# Patient Record
Sex: MALE | Race: WHITE
[De-identification: names, ages, dates, MRNs, and addresses within clinical notes are randomized per-mention and may not be internally consistent; named-entity substitution may affect disease eponyms.]

---

## 2017-03-27 ENCOUNTER — HOSPITAL ENCOUNTER (OUTPATIENT)
Dept: HOSPITAL 47 - ORWHC2ENDO | Age: 67
Discharge: HOME | End: 2017-03-27
Payer: MEDICAID

## 2017-03-27 VITALS — DIASTOLIC BLOOD PRESSURE: 102 MMHG | HEART RATE: 40 BPM | SYSTOLIC BLOOD PRESSURE: 149 MMHG

## 2017-03-27 VITALS — BODY MASS INDEX: 36.6 KG/M2

## 2017-03-27 VITALS — TEMPERATURE: 97.8 F | RESPIRATION RATE: 18 BRPM

## 2017-03-27 DIAGNOSIS — D12.2: ICD-10-CM

## 2017-03-27 DIAGNOSIS — I10: ICD-10-CM

## 2017-03-27 DIAGNOSIS — Z79.899: ICD-10-CM

## 2017-03-27 DIAGNOSIS — K57.30: ICD-10-CM

## 2017-03-27 DIAGNOSIS — K64.8: ICD-10-CM

## 2017-03-27 DIAGNOSIS — Z12.11: Primary | ICD-10-CM

## 2017-03-27 DIAGNOSIS — Z79.82: ICD-10-CM

## 2017-03-27 PROCEDURE — 88305 TISSUE EXAM BY PATHOLOGIST: CPT

## 2017-03-27 PROCEDURE — 45380 COLONOSCOPY AND BIOPSY: CPT

## 2017-03-27 NOTE — P.PCN
Date of Procedure: 03/27/17


Procedure(s) Performed: 


Procedure: Colonoscopy and biopsy.





Preoperative diagnosis: Screening for neoplasia.





Postoperative diagnosis: 1. Diminutive polyp right colon biopsied but no other 

polyps or tumors seen.  2. Sigmoid diverticulosis with no evidence of acute 

diverticulitis or strictures.





Preparation: HalfLytely prep.





Sedation: Was provided by anesthesia.





Brief clinical history: The patient is a 66-year-old male who is referred for 

this evaluation for screening for neoplasia.  He has no abdominal complaints, 

bleeding or anemia.  No family history of colon cancer.  His last colonoscopy 

was around 12 years ago.





Procedure: With the patient on his left lateral decubitus position and after 

informed consent and adequate sedation, the perianal area was inspected and it 

did not show any fissures or fistulas.  There were no masses felt on digital 

rectal examination.  The Olympus CFQ 160L video colonoscope was then inserted 

in the rectum in the usual fashion and advanced to the cecum.  The preparation 

was less than ideal as there was some solid fecal debris and small fecal 

material that could not be suctioned totally.  The mucosa appeared healthy.  

There was a diminutive polyp in the proximal right colon which I biopsied but 

there were no large polyps or tumors.  Multiple diverticular orifices were seen 

scattered in the sigmoid with no evidence of acute diverticulitis or 

strictures.  I retroflexed the endoscope in the rectum before the endoscope was 

withdrawn.  Low-grade internal hemorrhoids were noted but there was no bleeding.





The patient tolerated the procedure well.





Plan: The patient was reassured.  Discussed dietary measures.  In light of his 

less than ideal preparation and the finding of a diminutive polyp on the right 

colon, I recommended repeat exam in 5 years.  He will follow up with you as 

planned.

## 2017-08-17 ENCOUNTER — HOSPITAL ENCOUNTER (OUTPATIENT)
Dept: HOSPITAL 47 - LABWHC1 | Age: 67
Discharge: HOME | End: 2017-08-17
Payer: MEDICAID

## 2017-08-17 DIAGNOSIS — E66.9: ICD-10-CM

## 2017-08-17 DIAGNOSIS — N40.0: ICD-10-CM

## 2017-08-17 DIAGNOSIS — I10: Primary | ICD-10-CM

## 2017-08-17 DIAGNOSIS — E78.00: ICD-10-CM

## 2017-08-17 LAB
ALP SERPL-CCNC: 86 U/L (ref 38–126)
ALT SERPL-CCNC: 34 U/L (ref 21–72)
ANION GAP SERPL CALC-SCNC: 12 MMOL/L
AST SERPL-CCNC: 27 U/L (ref 17–59)
BASOPHILS # BLD AUTO: 0 K/UL (ref 0–0.2)
BASOPHILS NFR BLD AUTO: 0 %
BUN SERPL-SCNC: 16 MG/DL (ref 9–20)
CALCIUM SPEC-MCNC: 9.2 MG/DL (ref 8.4–10.2)
CH: 32.8
CHCM: 34.3
CHLORIDE SERPL-SCNC: 109 MMOL/L (ref 98–107)
CHOLEST SERPL-MCNC: 143 MG/DL (ref ?–200)
CK SERPL-CCNC: 193 U/L (ref 55–170)
CO2 SERPL-SCNC: 22 MMOL/L (ref 22–30)
EOSINOPHIL # BLD AUTO: 0.1 K/UL (ref 0–0.7)
EOSINOPHIL NFR BLD AUTO: 2 %
ERYTHROCYTE [DISTWIDTH] IN BLOOD BY AUTOMATED COUNT: 4.7 M/UL (ref 4.3–5.9)
ERYTHROCYTE [DISTWIDTH] IN BLOOD: 14 % (ref 11.5–15.5)
GLUCOSE SERPL-MCNC: 106 MG/DL (ref 74–99)
HCT VFR BLD AUTO: 45.1 % (ref 39–53)
HDLC SERPL-MCNC: 35 MG/DL (ref 40–60)
HDW: 2.66
HEMOGLOBIN A1C: 5.2 % (ref 4.2–6.1)
HGB BLD-MCNC: 14.9 GM/DL (ref 13–17.5)
LUC NFR BLD AUTO: 2 %
LYMPHOCYTES # SPEC AUTO: 1.1 K/UL (ref 1–4.8)
LYMPHOCYTES NFR SPEC AUTO: 20 %
MCH RBC QN AUTO: 31.6 PG (ref 25–35)
MCHC RBC AUTO-ENTMCNC: 32.9 G/DL (ref 31–37)
MCV RBC AUTO: 96 FL (ref 80–100)
MONOCYTES # BLD AUTO: 0.4 K/UL (ref 0–1)
MONOCYTES NFR BLD AUTO: 7 %
NEUTROPHILS # BLD AUTO: 3.9 K/UL (ref 1.3–7.7)
NEUTROPHILS NFR BLD AUTO: 70 %
NON-AFRICAN AMERICAN GFR(MDRD): >60
PH UR: 6.5 [PH] (ref 5–8)
POTASSIUM SERPL-SCNC: 4 MMOL/L (ref 3.5–5.1)
PROT SERPL-MCNC: 6.5 G/DL (ref 6.3–8.2)
PSA SERPL-MCNC: 0.27 NG/ML (ref 0–4)
SODIUM SERPL-SCNC: 143 MMOL/L (ref 137–145)
SP GR UR: 1.01 (ref 1–1.03)
UA BILLING (MACRO VS. MICRO): (no result)
URATE SERPL-MCNC: 6 MG/DL (ref 3.5–8.5)
UROBILINOGEN UR QL STRIP: <2 MG/DL (ref ?–2)
WBC # BLD AUTO: 0.09 10*3/UL
WBC # BLD AUTO: 5.6 K/UL (ref 3.8–10.6)
WBC (PEROX): 5.89

## 2017-08-17 PROCEDURE — 84153 ASSAY OF PSA TOTAL: CPT

## 2017-08-17 PROCEDURE — 80053 COMPREHEN METABOLIC PANEL: CPT

## 2017-08-17 PROCEDURE — 36415 COLL VENOUS BLD VENIPUNCTURE: CPT

## 2017-08-17 PROCEDURE — 80061 LIPID PANEL: CPT

## 2017-08-17 PROCEDURE — 84550 ASSAY OF BLOOD/URIC ACID: CPT

## 2017-08-17 PROCEDURE — 82550 ASSAY OF CK (CPK): CPT

## 2017-08-17 PROCEDURE — 85025 COMPLETE CBC W/AUTO DIFF WBC: CPT

## 2017-08-17 PROCEDURE — 84443 ASSAY THYROID STIM HORMONE: CPT

## 2017-08-17 PROCEDURE — 83036 HEMOGLOBIN GLYCOSYLATED A1C: CPT

## 2017-08-17 PROCEDURE — 84439 ASSAY OF FREE THYROXINE: CPT

## 2017-08-17 PROCEDURE — 81003 URINALYSIS AUTO W/O SCOPE: CPT

## 2019-01-18 ENCOUNTER — HOSPITAL ENCOUNTER (OUTPATIENT)
Dept: HOSPITAL 47 - LABWHC1 | Age: 69
Discharge: HOME | End: 2019-01-18
Attending: NURSE PRACTITIONER
Payer: MEDICARE

## 2019-01-18 DIAGNOSIS — E78.00: ICD-10-CM

## 2019-01-18 DIAGNOSIS — Z00.00: Primary | ICD-10-CM

## 2019-01-18 DIAGNOSIS — I10: ICD-10-CM

## 2019-01-18 DIAGNOSIS — N40.1: ICD-10-CM

## 2019-01-18 DIAGNOSIS — R73.9: ICD-10-CM

## 2019-01-18 LAB
ALBUMIN SERPL-MCNC: 4.3 G/DL (ref 3.8–4.9)
ALBUMIN/GLOB SERPL: 1.87 G/DL (ref 1.2–2.1)
ALP SERPL-CCNC: 98 U/L (ref 41–126)
ALT SERPL-CCNC: 24 U/L (ref 10–49)
ANION GAP SERPL CALC-SCNC: 9.3 MMOL/L (ref 4–12)
AST SERPL-CCNC: 27 U/L (ref 14–35)
BASOPHILS # BLD AUTO: 0 K/UL (ref 0–0.2)
BASOPHILS NFR BLD AUTO: 0 %
BUN SERPL-SCNC: 23 MG/DL (ref 9–27)
CALCIUM SPEC-MCNC: 9.1 MG/DL (ref 8.7–10.3)
CHLORIDE SERPL-SCNC: 106 MMOL/L (ref 96–109)
CHOLEST SERPL-MCNC: 181 MG/DL (ref 0–200)
CO2 SERPL-SCNC: 25.7 MMOL/L (ref 21.6–31.8)
EOSINOPHIL # BLD AUTO: 0.1 K/UL (ref 0–0.7)
EOSINOPHIL NFR BLD AUTO: 2 %
ERYTHROCYTE [DISTWIDTH] IN BLOOD BY AUTOMATED COUNT: 4.83 M/UL (ref 4.3–5.9)
ERYTHROCYTE [DISTWIDTH] IN BLOOD: 13.3 % (ref 11.5–15.5)
GLOBULIN SER CALC-MCNC: 2.3 G/DL (ref 1.6–3.3)
GLUCOSE SERPL-MCNC: 97 MG/DL (ref 70–110)
HBA1C MFR BLD: 5.1 % (ref 4–6)
HCT VFR BLD AUTO: 45.6 % (ref 39–53)
HDLC SERPL-MCNC: 38 MG/DL (ref 40–60)
HGB BLD-MCNC: 15.3 GM/DL (ref 13–17.5)
LDLC SERPL CALC-MCNC: 127.4 MG/DL (ref 0–131)
LYMPHOCYTES # SPEC AUTO: 0.9 K/UL (ref 1–4.8)
LYMPHOCYTES NFR SPEC AUTO: 16 %
MCH RBC QN AUTO: 31.6 PG (ref 25–35)
MCHC RBC AUTO-ENTMCNC: 33.5 G/DL (ref 31–37)
MCV RBC AUTO: 94.4 FL (ref 80–100)
MONOCYTES # BLD AUTO: 0.6 K/UL (ref 0–1)
MONOCYTES NFR BLD AUTO: 11 %
NEUTROPHILS # BLD AUTO: 3.9 K/UL (ref 1.3–7.7)
NEUTROPHILS NFR BLD AUTO: 69 %
PLATELET # BLD AUTO: 156 K/UL (ref 150–450)
POTASSIUM SERPL-SCNC: 4.3 MMOL/L (ref 3.5–5.5)
PROT SERPL-MCNC: 6.6 G/DL (ref 6.2–8.2)
SODIUM SERPL-SCNC: 141 MMOL/L (ref 135–145)
T4 FREE SERPL-MCNC: 1.2 NG/DL (ref 0.8–1.8)
TRIGL SERPL-MCNC: 78 MG/DL (ref 0–149)
URATE SERPL-MCNC: 6.5 MG/DL (ref 3.7–8.7)
VLDLC SERPL CALC-MCNC: 15.6 MG/DL (ref 5–40)
WBC # BLD AUTO: 5.6 K/UL (ref 3.8–10.6)

## 2019-01-18 PROCEDURE — 80061 LIPID PANEL: CPT

## 2019-01-18 PROCEDURE — 80053 COMPREHEN METABOLIC PANEL: CPT

## 2019-01-18 PROCEDURE — 84443 ASSAY THYROID STIM HORMONE: CPT

## 2019-01-18 PROCEDURE — 85025 COMPLETE CBC W/AUTO DIFF WBC: CPT

## 2019-01-18 PROCEDURE — 84153 ASSAY OF PSA TOTAL: CPT

## 2019-01-18 PROCEDURE — 84550 ASSAY OF BLOOD/URIC ACID: CPT

## 2019-01-18 PROCEDURE — 83036 HEMOGLOBIN GLYCOSYLATED A1C: CPT

## 2019-01-18 PROCEDURE — 84439 ASSAY OF FREE THYROXINE: CPT

## 2019-01-18 PROCEDURE — 36415 COLL VENOUS BLD VENIPUNCTURE: CPT

## 2019-01-23 ENCOUNTER — HOSPITAL ENCOUNTER (OUTPATIENT)
Dept: HOSPITAL 47 - RADUSWWP | Age: 69
End: 2019-01-23
Attending: INTERNAL MEDICINE
Payer: MEDICARE

## 2019-01-23 DIAGNOSIS — N43.3: Primary | ICD-10-CM

## 2019-01-23 DIAGNOSIS — I27.21: ICD-10-CM

## 2019-01-23 DIAGNOSIS — I71.2: ICD-10-CM

## 2019-01-23 DIAGNOSIS — I77.810: ICD-10-CM

## 2019-01-23 DIAGNOSIS — R91.1: ICD-10-CM

## 2019-01-23 DIAGNOSIS — I25.10: ICD-10-CM

## 2019-01-23 PROCEDURE — 93975 VASCULAR STUDY: CPT

## 2019-01-23 PROCEDURE — 76870 US EXAM SCROTUM: CPT

## 2019-01-23 PROCEDURE — 71275 CT ANGIOGRAPHY CHEST: CPT

## 2019-01-23 NOTE — CT
EXAMINATION TYPE: CT angio chest

 

DATE OF EXAM: 1/23/2019

 

COMPARISON: 8/26/2012

 

HISTORY: 68-year-old male Thoracic aortic ectasia. Family history of aneurysm.

 

TECHNIQUE: Contiguous axial scanning of the chest performed with IV Contrast, patient injected with 1
00 mL of Isovue 370. Coronal/sagittal MIP reconstructions performed. 3-D reconstructions generated on
 a dedicated independent workstation.

 

CT DLP: 762 mGycm

Automated exposure control for dose reduction was used.

 

FINDINGS: 

Heart upper limits of normal in size without pericardial effusion. Extensive coronary vessel calcific
ations are present.

 

Aortic root measures 4.4 cm versus approximately 4.2 cm in 2012.

Ascending aorta measures 4.6 cm versus 4.4 cm in 2012.

Proximal arch measures 4.4 cm.

Mild atherosclerotic arch calcifications with bovine configuration to the aortic arch.

Small 8 mm diverticulum at the origin of what appears to be the bronchial artery. This seems to have 
been present on the 2012 exam in retrospect.

Upper descending thoracic aorta measures 3.2 cm versus 3.2 cm, previously.

Lower descending thoracic aorta is ectatic at 2.8 cm versus 2.7 cm, previously.

Aorta at the thoracoabdominal junction is ectatic at 2.9 cm versus 2.8 cm, previously.

There is a separate origin of the common hepatic artery from the aorta. Additionally, accessory right
 renal artery is noted.

 

Right and left main pulmonary arteries measure 3.0 and 2.7 cm, respectively.

 

No thoracic lymphadenopathy by CT size criteria.

 

Evaluation of the lungs shows mild diffuse bronchial wall thickening. Subpleural strandy opacity mary
pheral right midlung has increased in the interval suggesting scarring or atelectasis.

 

Stable 7 mm anterior right midlung pulmonary nodule, axial image 61. No consolidation or pleural effu
ольга.

 

Mild diffuse low-density thickening of the bilateral adrenal glands without discrete nodularity.

 

Bones: Scattered small endplate Schmorl's nodes mid to lower thoracic spine. Fatty matrix hemangioma 
within the T9 vertebral body. No osseous structure process.

 

 

IMPRESSION: 

 

1. THORACIC AORTIC ANEURYSM (AORTIC ROOT 4.4 CM VERSUS 4.2 CM IN 2012, ASCENDING 4.6 CM VERSUS 4.4 CM
, PROXIMAL ARCH 4.4 CM, UPPER DESCENDING 3.2 CM STABLE).

2. ADDITIONAL ECTASIA LOWER DESCENDING THORACIC AORTA UP TO 2.9 CM. INCIDENTAL 8 MM DIVERTICULUM AT T
HE ORIGIN OF THE BRONCHIAL ARTERY WAS PRESENT IN 2012.

3. CAD AND PULMONARY ARTERIAL HYPERTENSION.

4. MILD DIFFUSE BRONCHIAL WALL THICKENING SUGGESTS BRONCHITIS OR ASTHMA. 7 MM RIGHT MID LUNG PULMONAR
Y NODULE IS STABLE AND BENIGN.

## 2019-01-23 NOTE — US
EXAMINATION TYPE: US scrotum with doppler.  Grayscale and color Doppler Duplex imaging performed of t
abebe scrotum.

 

DATE OF EXAM: 1/23/2019

 

COMPARISON: NONE

 

CLINICAL HISTORY: I77.810 Thoracic aortic ectasia, D29.22 Benign. swollen right testicle, no pain

 

 

EXAM MEASUREMENTS:

 

TESTICLES:

Right Testicle:  4.4 x 3.3 x 2.7 cm

Left Testicle:  4.7 x 3.3 x 2.9 cm

 

EPIDIDYMIS HEAD:

Right Epididymis:  Not seen due to fluid collection obscuring anatomy

Left Epididymis:  1.5 cm  

 

Doppler performed to assess for testicular vascularity; good bilateral color flow and waveforms are s
een.   There is no evidence of testicular torsion.

 

Presence of hydroceles:  YES, extensive bilateral loculated fluid collections

Presence of varicoceles:  no

 

Unable to image testicles side by side due to extensive loculated fluid collections. 

 

 

 

 

 

IMPRESSION:

1. Extensive loculated fluid collections/hydroceles are noted bilaterally.

## 2019-04-05 ENCOUNTER — HOSPITAL ENCOUNTER (OUTPATIENT)
Dept: HOSPITAL 47 - LABWHC1 | Age: 69
Discharge: HOME | End: 2019-04-05
Attending: INTERNAL MEDICINE
Payer: MEDICARE

## 2019-04-05 DIAGNOSIS — E78.2: Primary | ICD-10-CM

## 2019-04-05 LAB
ALT SERPL-CCNC: 30 U/L (ref 10–49)
AST SERPL-CCNC: 33 U/L (ref 14–35)
CHOLEST SERPL-MCNC: 107 MG/DL (ref 0–200)
HDLC SERPL-MCNC: 32 MG/DL (ref 40–60)
LDLC SERPL CALC-MCNC: 57 MG/DL (ref 0–131)
TRIGL SERPL-MCNC: 90 MG/DL (ref 0–149)
VLDLC SERPL CALC-MCNC: 18 MG/DL (ref 5–40)

## 2019-04-05 PROCEDURE — 84460 ALANINE AMINO (ALT) (SGPT): CPT

## 2019-04-05 PROCEDURE — 36415 COLL VENOUS BLD VENIPUNCTURE: CPT

## 2019-04-05 PROCEDURE — 84450 TRANSFERASE (AST) (SGOT): CPT

## 2019-04-05 PROCEDURE — 80061 LIPID PANEL: CPT

## 2019-08-19 ENCOUNTER — HOSPITAL ENCOUNTER (OUTPATIENT)
Dept: HOSPITAL 47 - RADCTMAIN | Age: 69
Discharge: HOME | End: 2019-08-19
Attending: THORACIC SURGERY (CARDIOTHORACIC VASCULAR SURGERY)
Payer: MEDICARE

## 2019-08-19 DIAGNOSIS — R91.1: ICD-10-CM

## 2019-08-19 DIAGNOSIS — I71.2: Primary | ICD-10-CM

## 2019-08-19 LAB
ALBUMIN SERPL-MCNC: 3.9 G/DL (ref 3.5–5)
ALP SERPL-CCNC: 67 U/L (ref 38–126)
ALT SERPL-CCNC: 31 U/L (ref 21–72)
ANION GAP SERPL CALC-SCNC: 7 MMOL/L
AST SERPL-CCNC: 37 U/L (ref 17–59)
BASOPHILS # BLD AUTO: 0 K/UL (ref 0–0.2)
BASOPHILS NFR BLD AUTO: 0 %
BUN SERPL-SCNC: 21 MG/DL (ref 9–20)
CALCIUM SPEC-MCNC: 8.8 MG/DL (ref 8.4–10.2)
CHLORIDE SERPL-SCNC: 111 MMOL/L (ref 98–107)
CHOLEST SERPL-MCNC: 111 MG/DL (ref ?–200)
CO2 SERPL-SCNC: 25 MMOL/L (ref 22–30)
EOSINOPHIL # BLD AUTO: 0.1 K/UL (ref 0–0.7)
EOSINOPHIL NFR BLD AUTO: 2 %
ERYTHROCYTE [DISTWIDTH] IN BLOOD BY AUTOMATED COUNT: 4.7 M/UL (ref 4.3–5.9)
ERYTHROCYTE [DISTWIDTH] IN BLOOD: 15.1 % (ref 11.5–15.5)
GLUCOSE SERPL-MCNC: 113 MG/DL (ref 74–99)
HBA1C MFR BLD: 5.5 % (ref 4–6)
HCT VFR BLD AUTO: 43.7 % (ref 39–53)
HDLC SERPL-MCNC: 34 MG/DL (ref 40–60)
HGB BLD-MCNC: 14.7 GM/DL (ref 13–17.5)
LDLC SERPL CALC-MCNC: 65 MG/DL (ref 0–99)
LYMPHOCYTES # SPEC AUTO: 1.1 K/UL (ref 1–4.8)
LYMPHOCYTES NFR SPEC AUTO: 19 %
MAGNESIUM SPEC-SCNC: 2.1 MG/DL (ref 1.6–2.3)
MCH RBC QN AUTO: 31.3 PG (ref 25–35)
MCHC RBC AUTO-ENTMCNC: 33.7 G/DL (ref 31–37)
MCV RBC AUTO: 93 FL (ref 80–100)
MONOCYTES # BLD AUTO: 0.4 K/UL (ref 0–1)
MONOCYTES NFR BLD AUTO: 7 %
NEUTROPHILS # BLD AUTO: 4.1 K/UL (ref 1.3–7.7)
NEUTROPHILS NFR BLD AUTO: 70 %
PLATELET # BLD AUTO: 150 K/UL (ref 150–450)
POTASSIUM SERPL-SCNC: 4.4 MMOL/L (ref 3.5–5.1)
PROT SERPL-MCNC: 6.8 G/DL (ref 6.3–8.2)
SODIUM SERPL-SCNC: 143 MMOL/L (ref 137–145)
T4 FREE SERPL-MCNC: 1 NG/DL (ref 0.78–2.19)
TRIGL SERPL-MCNC: 61 MG/DL (ref ?–150)
WBC # BLD AUTO: 5.8 K/UL (ref 3.8–10.6)

## 2019-08-19 PROCEDURE — 80053 COMPREHEN METABOLIC PANEL: CPT

## 2019-08-19 PROCEDURE — 36415 COLL VENOUS BLD VENIPUNCTURE: CPT

## 2019-08-19 PROCEDURE — 71275 CT ANGIOGRAPHY CHEST: CPT

## 2019-08-19 PROCEDURE — 85025 COMPLETE CBC W/AUTO DIFF WBC: CPT

## 2019-08-19 PROCEDURE — 83036 HEMOGLOBIN GLYCOSYLATED A1C: CPT

## 2019-08-19 PROCEDURE — 84443 ASSAY THYROID STIM HORMONE: CPT

## 2019-08-19 PROCEDURE — 80061 LIPID PANEL: CPT

## 2019-08-19 PROCEDURE — 84439 ASSAY OF FREE THYROXINE: CPT

## 2019-08-19 PROCEDURE — 83735 ASSAY OF MAGNESIUM: CPT

## 2019-08-19 NOTE — CT
EXAMINATION TYPE: CT angio chest

 

DATE OF EXAM: 8/19/2019

 

COMPARISON: 1/23/2019

 

HISTORY: Thoracic aortic aneurysm, without rupture

 

CT DLP: 749.9 mGycm, Automated exposure control for dose reduction was used.

 

CONTRAST: Performed injected with 100 mL of Isovue 370.

 

TECHNIQUE: Axial images were obtained at 5 mm thick sections.  Reconstructed images are reviewed on [employer] computer in the coronal plane. 

 

FINDINGS: Portion of the thyroid visualized is normal.

 

There is an area of pleural thickening along the right upper lateral chest measuring 1.0 cm. Series 5
 image 24. There is a nodule at the same level measuring 0.5 cm.

 

No enlarged mediastinal or hilar adenopathy is evident.   The ascending aorta diameter at the level o
f the main pulmonary artery is 4.8 cm.  The main pulmonary artery diameter at the bifurcation is 2.7 
cm. Artery calcification is present.

 

The aortic arch transverse dimension is 2.7 cm. The descending thoracic aorta measures 3.2 cm. At the
 level of the diaphragm the aorta measures 2.8 cm.

 

Limited CT sections are obtained through the upper abdomen. Abdomen is essentially unremarkable.

 

IMPRESSIONS:

1. Aneurysmal dilatation of the ascending aorta measuring 4.8 cm at the level of the main pulmonary a
rtery.

2. 0.5 cm nodule right upper lobe, stable

## 2020-01-23 ENCOUNTER — HOSPITAL ENCOUNTER (OUTPATIENT)
Dept: HOSPITAL 47 - LABWHC1 | Age: 70
Discharge: HOME | End: 2020-01-23
Attending: INTERNAL MEDICINE
Payer: MEDICARE

## 2020-01-23 DIAGNOSIS — E78.5: ICD-10-CM

## 2020-01-23 DIAGNOSIS — I10: Primary | ICD-10-CM

## 2020-01-23 DIAGNOSIS — N40.0: ICD-10-CM

## 2020-01-23 LAB
ALBUMIN SERPL-MCNC: 4.2 G/DL (ref 3.8–4.9)
ALBUMIN/GLOB SERPL: 2.21 G/DL (ref 1.6–3.17)
ALP SERPL-CCNC: 96 U/L (ref 41–126)
ALT SERPL-CCNC: 38 U/L (ref 10–49)
ANION GAP SERPL CALC-SCNC: 5.6 MMOL/L (ref 4–12)
AST SERPL-CCNC: 29 U/L (ref 14–35)
BASOPHILS # BLD AUTO: 0 K/UL (ref 0–0.2)
BASOPHILS NFR BLD AUTO: 0 %
BUN SERPL-SCNC: 20 MG/DL (ref 9–27)
BUN/CREAT SERPL: 25 RATIO (ref 12–20)
CALCIUM SPEC-MCNC: 9 MG/DL (ref 8.7–10.3)
CHLORIDE SERPL-SCNC: 107 MMOL/L (ref 96–109)
CHOLEST SERPL-MCNC: 103 MG/DL (ref 0–200)
CK SERPL-CCNC: 92 U/L (ref 35–257)
CO2 SERPL-SCNC: 27.4 MMOL/L (ref 21.6–31.8)
EOSINOPHIL # BLD AUTO: 0.1 K/UL (ref 0–0.7)
EOSINOPHIL NFR BLD AUTO: 2 %
ERYTHROCYTE [DISTWIDTH] IN BLOOD BY AUTOMATED COUNT: 4.58 M/UL (ref 4.3–5.9)
ERYTHROCYTE [DISTWIDTH] IN BLOOD: 12.7 % (ref 11.5–15.5)
GLOBULIN SER CALC-MCNC: 1.9 G/DL (ref 1.6–3.3)
GLUCOSE SERPL-MCNC: 109 MG/DL (ref 70–110)
HBA1C MFR BLD: 5.2 % (ref 4–6)
HCT VFR BLD AUTO: 43.3 % (ref 39–53)
HDLC SERPL-MCNC: 35 MG/DL (ref 40–60)
HGB BLD-MCNC: 14.2 GM/DL (ref 13–17.5)
LDLC SERPL CALC-MCNC: 53.8 MG/DL (ref 0–131)
LYMPHOCYTES # SPEC AUTO: 1.1 K/UL (ref 1–4.8)
LYMPHOCYTES NFR SPEC AUTO: 20 %
MAGNESIUM SPEC-SCNC: 1.9 MG/DL (ref 1.5–2.4)
MCH RBC QN AUTO: 31.1 PG (ref 25–35)
MCHC RBC AUTO-ENTMCNC: 32.8 G/DL (ref 31–37)
MCV RBC AUTO: 94.6 FL (ref 80–100)
MONOCYTES # BLD AUTO: 0.4 K/UL (ref 0–1)
MONOCYTES NFR BLD AUTO: 8 %
NEUTROPHILS # BLD AUTO: 3.7 K/UL (ref 1.3–7.7)
NEUTROPHILS NFR BLD AUTO: 69 %
PH UR: 7 [PH] (ref 5–8)
PLATELET # BLD AUTO: 151 K/UL (ref 150–450)
POTASSIUM SERPL-SCNC: 3.9 MMOL/L (ref 3.5–5.5)
PROT SERPL-MCNC: 6.1 G/DL (ref 6.2–8.2)
SODIUM SERPL-SCNC: 140 MMOL/L (ref 135–145)
SP GR UR: 1.01 (ref 1–1.03)
T4 FREE SERPL-MCNC: 1.3 NG/DL (ref 0.8–1.8)
TRIGL SERPL-MCNC: 71 MG/DL (ref 0–149)
URATE SERPL-MCNC: 4.9 MG/DL (ref 3.7–8.7)
UROBILINOGEN UR QL STRIP: <2 MG/DL (ref ?–2)
VLDLC SERPL CALC-MCNC: 14.2 MG/DL (ref 5–40)
WBC # BLD AUTO: 5.4 K/UL (ref 3.8–10.6)

## 2020-01-23 PROCEDURE — 83036 HEMOGLOBIN GLYCOSYLATED A1C: CPT

## 2020-01-23 PROCEDURE — 80061 LIPID PANEL: CPT

## 2020-01-23 PROCEDURE — 84153 ASSAY OF PSA TOTAL: CPT

## 2020-01-23 PROCEDURE — 82550 ASSAY OF CK (CPK): CPT

## 2020-01-23 PROCEDURE — 81003 URINALYSIS AUTO W/O SCOPE: CPT

## 2020-01-23 PROCEDURE — 80053 COMPREHEN METABOLIC PANEL: CPT

## 2020-01-23 PROCEDURE — 84550 ASSAY OF BLOOD/URIC ACID: CPT

## 2020-01-23 PROCEDURE — 36415 COLL VENOUS BLD VENIPUNCTURE: CPT

## 2020-01-23 PROCEDURE — 83735 ASSAY OF MAGNESIUM: CPT

## 2020-01-23 PROCEDURE — 84439 ASSAY OF FREE THYROXINE: CPT

## 2020-01-23 PROCEDURE — 84443 ASSAY THYROID STIM HORMONE: CPT

## 2020-01-23 PROCEDURE — 85025 COMPLETE CBC W/AUTO DIFF WBC: CPT

## 2020-07-27 ENCOUNTER — HOSPITAL ENCOUNTER (OUTPATIENT)
Dept: HOSPITAL 47 - LABWHC1 | Age: 70
End: 2020-07-27
Attending: INTERNAL MEDICINE
Payer: MEDICARE

## 2020-07-27 DIAGNOSIS — I10: Primary | ICD-10-CM

## 2020-07-27 DIAGNOSIS — E78.2: ICD-10-CM

## 2020-07-27 LAB
ALBUMIN SERPL-MCNC: 4 G/DL (ref 3.8–4.9)
ALBUMIN/GLOB SERPL: 1.74 G/DL (ref 1.6–3.17)
ALP SERPL-CCNC: 91 U/L (ref 41–126)
ALT SERPL-CCNC: 25 U/L (ref 10–49)
ANION GAP SERPL CALC-SCNC: 9 MMOL/L (ref 4–12)
AST SERPL-CCNC: 26 U/L (ref 14–35)
BASOPHILS # BLD AUTO: 0 K/UL (ref 0–0.2)
BASOPHILS NFR BLD AUTO: 0 %
BUN SERPL-SCNC: 23 MG/DL (ref 9–27)
BUN/CREAT SERPL: 25.56 RATIO (ref 12–20)
CALCIUM SPEC-MCNC: 8.7 MG/DL (ref 8.7–10.3)
CHLORIDE SERPL-SCNC: 109 MMOL/L (ref 96–109)
CHOLEST SERPL-MCNC: 92 MG/DL (ref 0–200)
CO2 SERPL-SCNC: 23 MMOL/L (ref 21.6–31.8)
EOSINOPHIL # BLD AUTO: 0.1 K/UL (ref 0–0.7)
EOSINOPHIL NFR BLD AUTO: 2 %
ERYTHROCYTE [DISTWIDTH] IN BLOOD BY AUTOMATED COUNT: 4.48 M/UL (ref 4.3–5.9)
ERYTHROCYTE [DISTWIDTH] IN BLOOD: 13 % (ref 11.5–15.5)
GLOBULIN SER CALC-MCNC: 2.3 G/DL (ref 1.6–3.3)
GLUCOSE SERPL-MCNC: 109 MG/DL (ref 70–110)
HCT VFR BLD AUTO: 42.8 % (ref 39–53)
HDLC SERPL-MCNC: 35 MG/DL (ref 40–60)
HGB BLD-MCNC: 13.7 GM/DL (ref 13–17.5)
LDLC SERPL CALC-MCNC: 46.2 MG/DL (ref 0–131)
LYMPHOCYTES # SPEC AUTO: 1.2 K/UL (ref 1–4.8)
LYMPHOCYTES NFR SPEC AUTO: 19 %
MCH RBC QN AUTO: 30.6 PG (ref 25–35)
MCHC RBC AUTO-ENTMCNC: 32.1 G/DL (ref 31–37)
MCV RBC AUTO: 95.5 FL (ref 80–100)
MONOCYTES # BLD AUTO: 0.4 K/UL (ref 0–1)
MONOCYTES NFR BLD AUTO: 7 %
NEUTROPHILS # BLD AUTO: 4.5 K/UL (ref 1.3–7.7)
NEUTROPHILS NFR BLD AUTO: 70 %
PLATELET # BLD AUTO: 155 K/UL (ref 150–450)
POTASSIUM SERPL-SCNC: 4.4 MMOL/L (ref 3.5–5.5)
PROT SERPL-MCNC: 6.3 G/DL (ref 6.2–8.2)
SODIUM SERPL-SCNC: 141 MMOL/L (ref 135–145)
TRIGL SERPL-MCNC: 54 MG/DL (ref 0–149)
VLDLC SERPL CALC-MCNC: 10.8 MG/DL (ref 5–40)
WBC # BLD AUTO: 6.3 K/UL (ref 3.8–10.6)

## 2020-07-27 PROCEDURE — 80053 COMPREHEN METABOLIC PANEL: CPT

## 2020-07-27 PROCEDURE — 84443 ASSAY THYROID STIM HORMONE: CPT

## 2020-07-27 PROCEDURE — 36415 COLL VENOUS BLD VENIPUNCTURE: CPT

## 2020-07-27 PROCEDURE — 85025 COMPLETE CBC W/AUTO DIFF WBC: CPT

## 2020-07-27 PROCEDURE — 80061 LIPID PANEL: CPT

## 2020-08-11 ENCOUNTER — HOSPITAL ENCOUNTER (OUTPATIENT)
Dept: HOSPITAL 47 - RADCTMAIN | Age: 70
Discharge: HOME | End: 2020-08-11
Attending: THORACIC SURGERY (CARDIOTHORACIC VASCULAR SURGERY)
Payer: MEDICARE

## 2020-08-11 DIAGNOSIS — I71.2: Primary | ICD-10-CM

## 2020-08-11 LAB — BUN SERPL-SCNC: 26 MG/DL (ref 9–20)

## 2020-08-11 PROCEDURE — 36415 COLL VENOUS BLD VENIPUNCTURE: CPT

## 2020-08-11 PROCEDURE — 82565 ASSAY OF CREATININE: CPT

## 2020-08-11 PROCEDURE — 71275 CT ANGIOGRAPHY CHEST: CPT

## 2020-08-11 PROCEDURE — 84520 ASSAY OF UREA NITROGEN: CPT

## 2020-08-11 NOTE — CT
EXAMINATION TYPE: CT angio chest

 

DATE OF EXAM: 8/11/2020

 

COMPARISON: CTA chest August 19, 2019.

 

HISTORY: Thoracic aortic aneurysm

 

CT DLP: 1360 mGycm. Automated Exposure Control for Dose Reduction was Utilized.

 

 

CONTRAST: 

CTA scan of the thorax is performed without and with IV Contrast, patient injected with 100 ml mL of 
Isovue 370, aneurysm protocol. 3-D reconstructed Images are created on independent workstation  and r
eviewed.

 

FINDINGS:

 

LUNGS: The lungs are grossly clear, there is no new concerning parenchymal mass or nodule identified.
  Stable 7 x 5 mm nodule anteromedial right upper lung axial image 23. There is no new pleural effusi
on or pneumothorax seen bilaterally.  The tracheobronchial tree is patent.

 

MEDIASTINUM: There is persist ascending aortic aneurysm measuring up to 4.5 cm axial image 23 no sign
ificant change from last 2 studies. Bovine type aortic arch redemonstrated with mild calcified plaque
.  Some tortuous course to the distal thoracic aorta without aneurysmal extension. Accessory right re
nal artery redemonstrated There are no new greater than 1 cm hilar or mediastinal lymph nodes.   No c
ardiomegaly or pericardial effusion is seen. Severe three-vessel Coronary artery calcification is aga
in seen.

 

OTHER: Stable low dense thickening to both adrenal glands consistent with benign lipid rich hyperplas
ia. Persistent hemangioma involving T9 vertebra.

 

IMPRESSION: Stable 4.5 cm ascending aortic aneurysm.

## 2021-04-13 ENCOUNTER — HOSPITAL ENCOUNTER (OUTPATIENT)
Dept: HOSPITAL 47 - LABWHC1 | Age: 71
Discharge: HOME | End: 2021-04-13
Attending: INTERNAL MEDICINE
Payer: MEDICARE

## 2021-04-13 DIAGNOSIS — E78.2: Primary | ICD-10-CM

## 2021-04-13 LAB
ALBUMIN SERPL-MCNC: 4.2 G/DL (ref 3.8–4.9)
ALBUMIN/GLOB SERPL: 2.21 G/DL (ref 1.6–3.17)
ALP SERPL-CCNC: 97 U/L (ref 41–126)
ALT SERPL-CCNC: 26 U/L (ref 10–49)
ANION GAP SERPL CALC-SCNC: 9.2 MMOL/L (ref 4–12)
AST SERPL-CCNC: 25 U/L (ref 14–35)
BUN SERPL-SCNC: 14 MG/DL (ref 9–27)
BUN/CREAT SERPL: 20 RATIO (ref 12–20)
CALCIUM SPEC-MCNC: 8.9 MG/DL (ref 8.7–10.3)
CHLORIDE SERPL-SCNC: 109 MMOL/L (ref 96–109)
CHOLEST SERPL-MCNC: 104 MG/DL (ref 0–200)
CO2 SERPL-SCNC: 22.8 MMOL/L (ref 21.6–31.8)
GLOBULIN SER CALC-MCNC: 1.9 G/DL (ref 1.6–3.3)
GLUCOSE SERPL-MCNC: 116 MG/DL (ref 70–110)
HDLC SERPL-MCNC: 34 MG/DL (ref 40–60)
LDLC SERPL CALC-MCNC: 54 MG/DL (ref 0–131)
POTASSIUM SERPL-SCNC: 3.8 MMOL/L (ref 3.5–5.5)
PROT SERPL-MCNC: 6.1 G/DL (ref 6.2–8.2)
SODIUM SERPL-SCNC: 141 MMOL/L (ref 135–145)
TRIGL SERPL-MCNC: 80 MG/DL (ref 0–149)
VLDLC SERPL CALC-MCNC: 16 MG/DL (ref 5–40)

## 2021-04-13 PROCEDURE — 36415 COLL VENOUS BLD VENIPUNCTURE: CPT

## 2021-04-13 PROCEDURE — 80053 COMPREHEN METABOLIC PANEL: CPT

## 2021-04-13 PROCEDURE — 80061 LIPID PANEL: CPT

## 2021-08-25 ENCOUNTER — HOSPITAL ENCOUNTER (OUTPATIENT)
Dept: HOSPITAL 47 - RADCTMAIN | Age: 71
Discharge: HOME | End: 2021-08-25
Attending: THORACIC SURGERY (CARDIOTHORACIC VASCULAR SURGERY)
Payer: MEDICARE

## 2021-08-25 DIAGNOSIS — I71.2: Primary | ICD-10-CM

## 2021-08-25 DIAGNOSIS — I25.10: ICD-10-CM

## 2021-08-25 LAB — BUN SERPL-SCNC: 18 MG/DL (ref 9–20)

## 2021-08-25 PROCEDURE — 36415 COLL VENOUS BLD VENIPUNCTURE: CPT

## 2021-08-25 PROCEDURE — 84520 ASSAY OF UREA NITROGEN: CPT

## 2021-08-25 PROCEDURE — 71275 CT ANGIOGRAPHY CHEST: CPT

## 2021-08-25 PROCEDURE — 82565 ASSAY OF CREATININE: CPT

## 2021-08-25 NOTE — CT
EXAMINATION TYPE: CT angio chest

 

DATE OF EXAM: 8/25/2021

 

COMPARISON: 8/11/2020

 

HISTORY: 70-year-old male I71.2, follow up thoracic aortic aneurysm

 

TECHNIQUE: Contiguous axial scanning of the chest performed without and with IV Contrast, patient inj
ected with 100 mL of Isovue 370. Initial noncontrast images through the aortic arch. Coronal/sagittal
 MIP reconstructions performed. 3-D reconstructions generated on a dedicated independent workstation.


 

CT DLP: 1087.5 mGycm

Automated exposure control for dose reduction was used.

 

FINDINGS: 

Heart normal size without pericardial effusion. Three-vessel coronary artery calcifications are prese
nt and are unremarkable for coronary artery disease.

 

Aneurysmal aortic root at 4.6 cm, unchanged.

Aneurysmal ascending aorta 4.7 cm, unchanged.

Aneurysmal proximal arch at 4.5 cm, unchanged.

Bovine configuration to the aortic arch with mild atherosclerotic calcifications.

Ectatic upper descending thoracic aorta at 3.3 cm, unchanged.

Tortuous descending thoracic aorta.

Borderline aneurysmal descending thoracic aorta at the thoracoabdominal junction at 3.0 cm, unchanged
.

 

No thoracic lymphadenopathy by CT size criteria.

 

Large caliber to the main right and left pulmonary arteries measuring up to 3.0 cm suggesting underly
ing pulmonary artery hypertension.

 

Some stable pleural parenchymal scarring periphery of the right midlung.

 

Stable 7 mm anterior right midlung pulmonary nodule, axial image 25 suggesting a benign etiology.

 

Some minimal strandy scar periphery of the right base.

 

No consolidation or pleural effusion.

 

Visualized upper abdomen shows a 1.7 cm gallstone.

 

Bones: Old healed right-sided rib fracture deformity. Fatty matrix hemangioma within the T9 vertebral
 body.

 

 

IMPRESSION: 

 

1. STABLE ANEURYSMAL THORACIC AORTA (ROOT 4.6 CM, ASCENDING 4.7 CM, AND UPPER DESCENDING 3.3 CM).

2. CAD WITH 3 VESSEL CORONARY ARTERY CALCIFICATIONS.

3. CORRELATE FOR POSSIBLE UNDERLYING PULMONARY ARTERIAL HYPERTENSION.

4. A 1.7 CM GALLSTONE.

## 2021-10-20 ENCOUNTER — HOSPITAL ENCOUNTER (OUTPATIENT)
Dept: HOSPITAL 47 - LABWHC1 | Age: 71
Discharge: HOME | End: 2021-10-20
Attending: INTERNAL MEDICINE
Payer: MEDICARE

## 2021-10-20 DIAGNOSIS — E78.2: Primary | ICD-10-CM

## 2021-10-20 LAB
ALT SERPL-CCNC: 34 U/L (ref 10–49)
AST SERPL-CCNC: 31 U/L (ref 14–35)
CHOLEST SERPL-MCNC: 105 MG/DL (ref 0–200)
HDLC SERPL-MCNC: 37.9 MG/DL (ref 40–60)
LDLC SERPL CALC-MCNC: 51.5 MG/DL (ref 0–131)
TRIGL SERPL-MCNC: 78 MG/DL (ref 0–149)
VLDLC SERPL CALC-MCNC: 15.6 MG/DL (ref 5–40)

## 2021-10-20 PROCEDURE — 84450 TRANSFERASE (AST) (SGOT): CPT

## 2021-10-20 PROCEDURE — 80061 LIPID PANEL: CPT

## 2021-10-20 PROCEDURE — 36415 COLL VENOUS BLD VENIPUNCTURE: CPT

## 2021-10-20 PROCEDURE — 84460 ALANINE AMINO (ALT) (SGPT): CPT

## 2022-04-26 ENCOUNTER — HOSPITAL ENCOUNTER (OUTPATIENT)
Dept: HOSPITAL 47 - LABWHC1 | Age: 72
Discharge: HOME | End: 2022-04-26
Attending: INTERNAL MEDICINE
Payer: MEDICARE

## 2022-04-26 DIAGNOSIS — E78.2: Primary | ICD-10-CM

## 2022-04-26 LAB
ALT SERPL-CCNC: 26 U/L (ref 10–49)
AST SERPL-CCNC: 27 U/L (ref 14–35)
CHOLEST SERPL-MCNC: 121 MG/DL (ref 0–200)
HDLC SERPL-MCNC: 40.7 MG/DL (ref 40–60)
LDLC SERPL CALC-MCNC: 65.5 MG/DL (ref 0–131)
TRIGL SERPL-MCNC: 74.1 MG/DL (ref 0–149)
VLDLC SERPL CALC-MCNC: 14.82 MG/DL (ref 5–40)

## 2022-04-26 PROCEDURE — 80061 LIPID PANEL: CPT

## 2022-04-26 PROCEDURE — 84450 TRANSFERASE (AST) (SGOT): CPT

## 2022-04-26 PROCEDURE — 84460 ALANINE AMINO (ALT) (SGPT): CPT

## 2022-04-26 PROCEDURE — 36415 COLL VENOUS BLD VENIPUNCTURE: CPT

## 2023-04-27 ENCOUNTER — HOSPITAL ENCOUNTER (OUTPATIENT)
Dept: HOSPITAL 47 - LABWHC1 | Age: 73
Discharge: HOME | End: 2023-04-27
Attending: NURSE PRACTITIONER
Payer: MEDICARE

## 2023-04-27 DIAGNOSIS — I10: Primary | ICD-10-CM

## 2023-04-27 DIAGNOSIS — E78.2: ICD-10-CM

## 2023-04-27 LAB
ALBUMIN SERPL-MCNC: 4.1 G/DL (ref 3.8–4.9)
ALBUMIN/GLOB SERPL: 1.87 G/DL (ref 1.6–3.17)
ALP SERPL-CCNC: 98 U/L (ref 41–126)
ALT SERPL-CCNC: 21 U/L (ref 10–49)
ANION GAP SERPL CALC-SCNC: 10.2 MMOL/L (ref 10–18)
AST SERPL-CCNC: 21 U/L (ref 14–35)
BUN SERPL-SCNC: 16.5 MG/DL (ref 9–27)
BUN/CREAT SERPL: 22.79 RATIO (ref 12–20)
CALCIUM SPEC-MCNC: 9.2 MG/DL (ref 8.7–10.3)
CHLORIDE SERPL-SCNC: 107 MMOL/L (ref 96–109)
CHOLEST SERPL-MCNC: 111 MG/DL (ref 0–200)
CO2 SERPL-SCNC: 26.5 MMOL/L (ref 20–27.5)
GLOBULIN SER CALC-MCNC: 2.2 G/DL (ref 1.6–3.3)
GLUCOSE SERPL-MCNC: 112 MG/DL (ref 70–110)
HDLC SERPL-MCNC: 44.4 MG/DL (ref 40–60)
LDLC SERPL CALC-MCNC: 54.7 MG/DL (ref 0–131)
POTASSIUM SERPL-SCNC: 4.3 MMOL/L (ref 3.5–5.5)
PROT SERPL-MCNC: 6.3 G/DL (ref 6.2–8.2)
SODIUM SERPL-SCNC: 144 MMOL/L (ref 135–145)
TRIGL SERPL-MCNC: 59.5 MG/DL (ref 0–149)
VLDLC SERPL CALC-MCNC: 11.9 MG/DL (ref 5–40)

## 2023-04-27 PROCEDURE — 36415 COLL VENOUS BLD VENIPUNCTURE: CPT

## 2023-04-27 PROCEDURE — 80053 COMPREHEN METABOLIC PANEL: CPT

## 2023-04-27 PROCEDURE — 80061 LIPID PANEL: CPT

## 2023-08-28 ENCOUNTER — HOSPITAL ENCOUNTER (OUTPATIENT)
Dept: HOSPITAL 47 - RADCTMAIN | Age: 73
Discharge: HOME | End: 2023-08-28
Attending: THORACIC SURGERY (CARDIOTHORACIC VASCULAR SURGERY)
Payer: MEDICARE

## 2023-08-28 DIAGNOSIS — E04.1: ICD-10-CM

## 2023-08-28 DIAGNOSIS — K80.20: ICD-10-CM

## 2023-08-28 DIAGNOSIS — I71.21: Primary | ICD-10-CM

## 2023-08-28 DIAGNOSIS — I25.10: ICD-10-CM

## 2023-08-28 LAB — BUN SERPL-SCNC: 18 MG/DL (ref 9–20)

## 2023-08-28 PROCEDURE — 71260 CT THORAX DX C+: CPT

## 2023-08-28 PROCEDURE — 36415 COLL VENOUS BLD VENIPUNCTURE: CPT

## 2023-08-28 PROCEDURE — 84520 ASSAY OF UREA NITROGEN: CPT

## 2023-08-28 PROCEDURE — 82565 ASSAY OF CREATININE: CPT

## 2023-08-28 NOTE — CT
EXAMINATION TYPE: CT chest w con

 

DATE OF EXAM: 8/28/2023

 

COMPARISON: 8/25/2022, 8/25/2021

 

HISTORY: f/u aneurysm

 

CT DLP: 643 mGycm

Automated exposure control for dose reduction was used.

 

TECHNIQUE: 

CT scan of the chest is performed with IV Contrast, patient injected with 100 mL of Isovue 300.  MIP 
Images are created on CT scanner and reviewed. 3D reconstructed images are created on an independent 
workstation and reviewed.

 

FINDINGS:

 

LUNGS: The lungs are grossly clear, there is no concerning parenchymal mass or nodule identified.   T
here is no pleural effusion or pneumothorax seen.  The tracheobronchial tree is patent. Mild pleural-
based thickening noted. As MEDIASTINUM: There are no greater than 1 cm hilar or mediastinal lymph nod
es.   No pericardial effusion is seen.  There is a 4.7 x 4.9 cm ascending aortic aneurysm. Ectasia of
 the descending thoracic aorta. Variant anatomy of the celiac trunk. There is mild atherosclerotic ch
anges. No evidence of dissection. Calcification aortic valve noted. Coronary artery dense calcificati
on seen in there is mild cardiomegaly.

 

OTHER:  Hypertrophic and degenerative changes of the spine. A vertebral body hemangioma bilateral adr
enal gland thickening. There is cholelithiasis. 1 cm right thyroid inferior nodule.

 

 

IMPRESSION:  

1. Ascending aortic aneurysm measuring 4.7 x 4.9 cm and previously measuring 4.6 x 4.9 cm. No signifi
cant interval change.

2. Cholelithiasis.

3. Dense coronary artery calcification.

4. Right lobe thyroid nodule.

## 2023-10-30 ENCOUNTER — HOSPITAL ENCOUNTER (OUTPATIENT)
Dept: HOSPITAL 47 - RADUSWWP | Age: 73
Discharge: HOME | End: 2023-10-30
Attending: INTERNAL MEDICINE
Payer: MEDICARE

## 2023-10-30 DIAGNOSIS — E04.2: Primary | ICD-10-CM

## 2023-10-30 PROCEDURE — 76536 US EXAM OF HEAD AND NECK: CPT

## 2023-10-30 NOTE — US
EXAMINATION TYPE: US thyroid st tissue head/neck

 

DATE OF EXAM: 10/30/2023

 

COMPARISON: None

 

CLINICAL INDICATION: Male, 73 years old with history of E04.1 THYROID NODULE; CT showed thyroid nodul
e. 

 

GLAND SIZE:

 

Right Lobe: 3.7 x 2.6 x 2.3 cm

** Overall Parenchyma:  heterogenous

Left Lobe: 3.3 x 1.4 x 1.4 cm

** Overall Parenchyma:  heterogenous

Isthmus Thickness:  0.9 cm

 

NODULES- Suboptimal due to thyroid location and patient breathing

 

RIGHT:   # of nodules measured on right: 2

1.  2.6 X 2.0  x 2.5 cm, lower mid,  ** Prior size: No prior 

TIRADS Score: 4

TIRADS Category 4: Moderately Suspicious

Composition: Solid or almost completely solid (2 points).

Echogenicity: Hypoechoic (2 points).

Shape: Wider than tall (0 points).

Margin: Smooth (0 points).

Echogenic foci:

None or large comet-tail artifacts (0 points)

Recommendation: If >1.5cm: FNA; If >1cm: Follow up at 1,2, 3,5 years

 

2.  0.7 X 0.6  x 0.7 cm, upper mid,  ** Prior size: No prior 

TIRADS Score: 3

TIRADS Category 3: Mildly Suspicious

Composition: Solid or almost completely solid (2 points).

Echogenicity: Hyperechoic or isoechoic (1 point).

Shape: Wider than tall (0 points).

Margin: Ill-defined (0 points).

Echogenic foci:

None or large comet-tail artifacts (0 points)

Recommendation: If >2.5cm: FNA; If >1.5cm: Follow up at 1,3,5 years

 

LEFT:    # of nodules measured on left:  1

1.  0.8 X 0.7  x 0.6 cm, mid lateral, ** Prior size: No prior 

TIRADS Score: 3

TIRADS Category 3: Mildly Suspicious

Composition: Solid or almost completely solid (2 points).

Echogenicity: Hyperechoic or isoechoic (1 point).

Shape: Wider than tall (0 points).

Margin: Smooth (0 points).

Echogenic foci:

None or large comet-tail artifacts (0 points)

Recommendation: If >2.5cm: FNA; If >1.5cm: Follow up at 1,3,5 years

ISTHMUS:    # of nodules measured in the isthmus:  0

 

Bilateral neck scanned, no evidence of lymphadenopathy.

 

IMPRESSION:

Bilateral thyroid nodules of which a right thyroid nodule meets criteria for needle aspiration if not
 already performed. Thyroid nodule #1 mentioned above.

## 2023-12-04 ENCOUNTER — HOSPITAL ENCOUNTER (OUTPATIENT)
Dept: HOSPITAL 47 - RADPROMAIN | Age: 73
Discharge: HOME | End: 2023-12-04
Attending: INTERNAL MEDICINE
Payer: MEDICARE

## 2023-12-04 VITALS — SYSTOLIC BLOOD PRESSURE: 139 MMHG | HEART RATE: 52 BPM | DIASTOLIC BLOOD PRESSURE: 71 MMHG

## 2023-12-04 VITALS — RESPIRATION RATE: 16 BRPM | TEMPERATURE: 97.8 F

## 2023-12-04 DIAGNOSIS — E04.1: Primary | ICD-10-CM

## 2023-12-04 PROCEDURE — 10005 FNA BX W/US GDN 1ST LES: CPT

## 2023-12-04 PROCEDURE — 88305 TISSUE EXAM BY PATHOLOGIST: CPT

## 2023-12-04 PROCEDURE — 88173 CYTOPATH EVAL FNA REPORT: CPT

## 2023-12-04 NOTE — US
ULTRASOUND GUIDED FNA THYROID BIOPSY:

 

CLINICAL HISTORY: Right thyroid nodule

 

FINDINGS: 

The procedure was explained to the patient.  The risks, complications, benefits and alternatives were
 discussed and any questions were answered.  Informed consent was obtained.  Patient was placed supin
e on the ultrasound table and prepped and draped in the usual sterile fashion.  Utilizing a 25 gauge 
needle, five passes were made into the requested right thyroid nodule.  

 

Patient was stable throughout the procedure.  Pathology is pending.

 

All elements of maximal barrier technique were utilized.

 

IMPRESSION: 

1.  Successful ultrasound guided FNA thyroid biopsy.

## 2024-08-26 ENCOUNTER — HOSPITAL ENCOUNTER (OUTPATIENT)
Dept: HOSPITAL 47 - RADCTMAIN | Age: 74
Discharge: HOME | End: 2024-08-26
Attending: THORACIC SURGERY (CARDIOTHORACIC VASCULAR SURGERY)
Payer: MEDICARE

## 2024-08-26 LAB — BUN SERPL-SCNC: 21 MG/DL (ref 9–20)

## 2024-08-26 PROCEDURE — 82565 ASSAY OF CREATININE: CPT

## 2024-08-26 PROCEDURE — 36415 COLL VENOUS BLD VENIPUNCTURE: CPT

## 2024-08-26 PROCEDURE — 84520 ASSAY OF UREA NITROGEN: CPT

## 2024-08-26 PROCEDURE — 71275 CT ANGIOGRAPHY CHEST: CPT

## 2024-08-26 NOTE — CT
EXAMINATION TYPE: CT angio chest

 

DATE OF EXAM: 8/26/2024 3:04 PM

 

COMPARISON: 8/25/2022

 

HISTORY: f/u thoracic aortic aneurysm

 

CT DLP: 1562.1 mGycm

Automated exposure control for dose reduction was used.

 

CONTRAST: 

CTA scan of the thorax is performed with IV Contrast, patient injected with 100 mL of Isovue 370, pul
monary embolism protocol. .  

 

FINDINGS:

 

There is a stable 8 mm nodule in the right upper lobe. No new or suspicious lung mass or nodule is se
en.

 

There is no abnormal airspace or interstitial density.

 

There is no pleural effusion, pleural thickening or pneumothorax.

 

There are stable 4.8-4.9 cm aneurysmal dilatation of the ascending thoracic aorta.

 

There is no mediastinal, hilar or axillary adenopathy. The heart size is within normal limits.

 

The osseous structures of the thorax are intact.

 

Limited scanning reveals cholelithiasis but no other significant abnormality.

 

IMPRESSION:

1. Stable 4.8-4.9 cm aneurysmal dilatation of the ascending thoracic aorta.

2. Stable 8 mm right upper lobe pulmonary nodule.

3. No acute cardiopulmonary disease.

4. Cholelithiasis

## 2024-12-18 ENCOUNTER — HOSPITAL ENCOUNTER (OUTPATIENT)
Dept: HOSPITAL 47 - ORWHC2ENDO | Age: 74
Discharge: HOME | End: 2024-12-18
Attending: INTERNAL MEDICINE
Payer: MEDICARE

## 2024-12-18 VITALS — SYSTOLIC BLOOD PRESSURE: 115 MMHG | HEART RATE: 54 BPM | DIASTOLIC BLOOD PRESSURE: 77 MMHG

## 2024-12-18 VITALS — TEMPERATURE: 97.4 F | RESPIRATION RATE: 16 BRPM

## 2024-12-18 DIAGNOSIS — D12.3: ICD-10-CM

## 2024-12-18 DIAGNOSIS — Z79.02: ICD-10-CM

## 2024-12-18 DIAGNOSIS — K57.30: ICD-10-CM

## 2024-12-18 DIAGNOSIS — Z79.899: ICD-10-CM

## 2024-12-18 DIAGNOSIS — Z79.82: ICD-10-CM

## 2024-12-18 DIAGNOSIS — E78.5: ICD-10-CM

## 2024-12-18 DIAGNOSIS — D12.2: Primary | ICD-10-CM

## 2024-12-18 DIAGNOSIS — I10: ICD-10-CM

## 2024-12-18 PROCEDURE — 88305 TISSUE EXAM BY PATHOLOGIST: CPT

## 2024-12-18 PROCEDURE — 45380 COLONOSCOPY AND BIOPSY: CPT

## 2024-12-18 PROCEDURE — 45385 COLONOSCOPY W/LESION REMOVAL: CPT

## 2024-12-18 RX ADMIN — POTASSIUM CHLORIDE ONE MLS: 14.9 INJECTION, SOLUTION INTRAVENOUS at 08:00

## 2024-12-18 RX ADMIN — POTASSIUM CHLORIDE SCH MLS/HR: 14.9 INJECTION, SOLUTION INTRAVENOUS at 08:00

## 2024-12-18 NOTE — P.PCN
Date of Procedure: 12/18/24


Procedure(s) Performed: 


BRIEF HISTORY: Patient is a 74-year-old pleasant white male scheduled for an 

elective colonoscopy as a part of screening for colon cancer/positive Cologuard





PROCEDURE PERFORMED: Colonoscopy biopsy and snare polypectomy. 





PREOPERATIVE DIAGNOSIS: Screening for colon cancer/positive Cologuard. 





IV sedation per Anesthesia. 





PROCEDURE: After informed consent was obtained, the patient, was brought into 

the endoscopy unit. IV sedation was administered by Anesthesia under continuous 

monitoring.  Digital rectal examination was normal. Initially the Olympus CF-160

flexible video colonoscope was then inserted in the rectum, gradually advanced 

into the cecum without any difficulty. Careful examination was performed as the 

scope was gradually being withdrawn. Ileocecal valve and the appendiceal orifice

were visualized and appeared normal.  Prep was excellent. Mucosa of the cecum, 

appeared normal.  Descending colon there was a 3 mm polyp that was removed by 

cold biopsy.  In the transverse colon there was a 5 mm sessile polyp removed by 

cold biopsy.  Rest of the descending colon, 1.  The sigmoid colon there was an 8

mm polyp removed by cold snare polypectomy.  Moderate sigmoid diverticulosis 

seen.  Rest of sigmoid colon, and rectum appeared normal. Retroflexion was 

performed in the rectum and no lesions were seen. The patient tolerated the 

procedure well. 





IMPRESSION: 


3 mm ascending colon polyp status post cold biopsy


5 mm transverse colon polyp status post cold biopsy


8 mm sigmoid colon polyp status post cold snare polypectomy


Scattered sigmoid diverticulosis





RECOMMENDATIONS:  Findings of this examination were discussed with the patient 

as well as his family.  He was advised to follow-up with the biopsy results.  If

the biopsy reveals adenoma he can have repeat colonoscopy in 3 years..